# Patient Record
(demographics unavailable — no encounter records)

---

## 2025-07-11 NOTE — HISTORY OF PRESENT ILLNESS
[6] : 6 [0] : 0 [Localized] : localized [Sharp] : sharp [Throbbing] : throbbing [Meds] : meds [] : yes

## 2025-07-18 NOTE — DATA REVIEWED
[MRI] : MRI [Left] : left [Shoulder] : shoulder [I independently reviewed and interpreted images and report] : I independently reviewed and interpreted images and report [FreeTextEntry1] : greater tuberoisity fracture nondisplaced with large retracted cuff tear

## 2025-07-18 NOTE — HISTORY OF PRESENT ILLNESS
[de-identified] : 7/15/25: Patient is a 49-year-old male here to evaluate left shoulder pain. Patient notes he dislocated his shoulder on 7/8/25 when he slipped while walking up a hill, fell backwards and dislocated his LT SH. Went to ED in [facility] and had reduction performed. Patient notes sharp, throbbing pain localized to the shoulder. Patient notes pain is most prevalent with movement. N/t noted into LT hand.

## 2025-07-18 NOTE — HISTORY OF PRESENT ILLNESS
[de-identified] : 7/15/25: Patient is a 49-year-old male here to evaluate left shoulder pain. Patient notes he dislocated his shoulder on 7/8/25 when he slipped while walking up a hill, fell backwards and dislocated his LT SH. Went to ED in [facility] and had reduction performed. Patient notes sharp, throbbing pain localized to the shoulder. Patient notes pain is most prevalent with movement. N/t noted into LT hand.

## 2025-07-18 NOTE — ASSESSMENT
[FreeTextEntry1] : Dislocated LT SH and RCT,  Discussed SH replacement vs arthroscopic, 60-70% to fully recover with full strength.  Sling for 3-4 weeks and will start rehab after, will focus on passive motion. 2 months should be healed, and will be able to return to intense physical activity at 3-4 months After PT will plan to increase strength through the gym.  N/t noted into fingers related to axillary nerve paresthesia, should recover within a few weeks Will plan to book for sx next week to allow the bone to solidify, 7/28/25

## 2025-07-18 NOTE — REASON FOR VISIT
61year old male [facility], arrived via [facility] due to change in mental status and gait since 0600 this morning. PMHX of dementia, no recent falls per EMS, recently stated on an antibiotic due to rash on forehead.  Pt calm and agreeable pe
[FreeTextEntry2] : left shoulder pain
[FreeTextEntry2] : left shoulder pain

## 2025-07-18 NOTE — DISCUSSION/SUMMARY
[de-identified] : complicated case, tuberosity fracture with large acute retracted cuff tear as a result of dislocation, discussed issues, needs surgery before cuff retracts however want to get tuberosity healed so we have a place to fixate cuff , still might have to avoid second row and get creative for fixation, want to allow a couple of weeks for tuberosity to heal but need to fix cuff by four weeks We reviewed the MRI findings. We discussed treatment options, both operative and non-operative. I do think he is a candidate for surgery. Pain relief is a goal as well as improving function and motion.   Interscalene anesthesia, general anesthesia and post operative pain management were discussed. The importance of physical therapy postoperative, the gradual recovery and and the rehabilitation program with initial driving restrictions were noted. The use of a sling for for functional recovery was reviewed. Patient understands there are no guarantees. The benefits of decreased pain, increased function and restoring anatomy were outlined. The risks were reviewed including, but not limited to, infection, failure, bleeding, stiffness, pain, clotting, fracture, retear, hardware failure, deformity, functional limitation, scarring, neurovascular compromise, and narcotic use issues. Under certain circumstances we discussed, further surgery may be indicated.   Patient understands 100% recovery is not expected, and the desired level of function may not be achievable. The complicated nature of the patient's condition, including the tear pattern, was noted. We discussed the potential for a prolonged recovery course and the potential for this to affect the patients activities, which could include work regimen. Patients' questions were answered. Other options can be pursued, as we discussed.  Patient does wish to proceed with surgery. This could include shoulder arthroscopic rotator cuff repair vs reconstruction, debridement, synovectomy, subacromial decompression, posterior capsuleyosis of adhesions, possible biceps tenodesis vs tenotomy, distal clavicle resection, and any indicated procedures. We will schedule this at the earliest mutually convenient time. St vs LT issues noted. NSAID uses outlined. Patient will continue HEP, including sleeper stretch.  has neurologic issues from dislcoation and stretching of axillary nerve that can be aggravated by surgery , nothing to do now but will watch carefully  Prescribed patient Meloxicam 15mg take once daily and discussed risks of side effects, as well as timing/management of medication.  Side effects can include but are not limited to gastrointestinal ulcers and irritation, kidney failure, and bleeding issues. Use as directed and take with food to manage pain, inflammation, and discomfort.  prescribed oxycodone for post oerative pain

## 2025-07-18 NOTE — IMAGING
[de-identified] : nvi but paresthesias over deltoid area, weakness in external rotation, skin intact, pain with movement [Left] : left shoulder [FreeTextEntry1] : greater tuberosity fracture , jennifer rlocated

## 2025-07-18 NOTE — IMAGING
[de-identified] : nvi but paresthesias over deltoid area, weakness in external rotation, skin intact, pain with movement [Left] : left shoulder [FreeTextEntry1] : greater tuberosity fracture , jennifer rlocated

## 2025-07-18 NOTE — DISCUSSION/SUMMARY
[de-identified] : complicated case, tuberosity fracture with large acute retracted cuff tear as a result of dislocation, discussed issues, needs surgery before cuff retracts however want to get tuberosity healed so we have a place to fixate cuff , still might have to avoid second row and get creative for fixation, want to allow a couple of weeks for tuberosity to heal but need to fix cuff by four weeks We reviewed the MRI findings. We discussed treatment options, both operative and non-operative. I do think he is a candidate for surgery. Pain relief is a goal as well as improving function and motion.   Interscalene anesthesia, general anesthesia and post operative pain management were discussed. The importance of physical therapy postoperative, the gradual recovery and and the rehabilitation program with initial driving restrictions were noted. The use of a sling for for functional recovery was reviewed. Patient understands there are no guarantees. The benefits of decreased pain, increased function and restoring anatomy were outlined. The risks were reviewed including, but not limited to, infection, failure, bleeding, stiffness, pain, clotting, fracture, retear, hardware failure, deformity, functional limitation, scarring, neurovascular compromise, and narcotic use issues. Under certain circumstances we discussed, further surgery may be indicated.   Patient understands 100% recovery is not expected, and the desired level of function may not be achievable. The complicated nature of the patient's condition, including the tear pattern, was noted. We discussed the potential for a prolonged recovery course and the potential for this to affect the patients activities, which could include work regimen. Patients' questions were answered. Other options can be pursued, as we discussed.  Patient does wish to proceed with surgery. This could include shoulder arthroscopic rotator cuff repair vs reconstruction, debridement, synovectomy, subacromial decompression, posterior capsuleyosis of adhesions, possible biceps tenodesis vs tenotomy, distal clavicle resection, and any indicated procedures. We will schedule this at the earliest mutually convenient time. St vs LT issues noted. NSAID uses outlined. Patient will continue HEP, including sleeper stretch.  has neurologic issues from dislcoation and stretching of axillary nerve that can be aggravated by surgery , nothing to do now but will watch carefully  Prescribed patient Meloxicam 15mg take once daily and discussed risks of side effects, as well as timing/management of medication.  Side effects can include but are not limited to gastrointestinal ulcers and irritation, kidney failure, and bleeding issues. Use as directed and take with food to manage pain, inflammation, and discomfort.  prescribed oxycodone for post oerative pain